# Patient Record
Sex: FEMALE | Race: WHITE | NOT HISPANIC OR LATINO | URBAN - METROPOLITAN AREA
[De-identification: names, ages, dates, MRNs, and addresses within clinical notes are randomized per-mention and may not be internally consistent; named-entity substitution may affect disease eponyms.]

---

## 2018-11-11 ENCOUNTER — EMERGENCY (EMERGENCY)
Facility: HOSPITAL | Age: 28
LOS: 0 days | Discharge: HOME | End: 2018-11-11
Attending: EMERGENCY MEDICINE | Admitting: EMERGENCY MEDICINE
Payer: SELF-PAY

## 2018-11-11 VITALS
SYSTOLIC BLOOD PRESSURE: 121 MMHG | RESPIRATION RATE: 18 BRPM | HEIGHT: 67 IN | TEMPERATURE: 98 F | HEART RATE: 93 BPM | OXYGEN SATURATION: 100 % | DIASTOLIC BLOOD PRESSURE: 80 MMHG | WEIGHT: 169.98 LBS

## 2018-11-11 DIAGNOSIS — S83.91XA SPRAIN OF UNSPECIFIED SITE OF RIGHT KNEE, INITIAL ENCOUNTER: ICD-10-CM

## 2018-11-11 DIAGNOSIS — M25.561 PAIN IN RIGHT KNEE: ICD-10-CM
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
## 2018-11-11 PROCEDURE — 99284 EMERGENCY DEPT VISIT MOD MDM: CPT

## 2018-11-11 PROCEDURE — 73562 X-RAY EXAM OF KNEE 3: CPT | Mod: 26,RT

## 2018-11-11 RX ORDER — OXYCODONE AND ACETAMINOPHEN 5; 325 MG/1; MG/1
1 TABLET ORAL ONCE
Qty: 0 | Refills: 0 | Status: DISCONTINUED | OUTPATIENT
Start: 2018-11-11 | End: 2018-11-11

## 2018-11-11 RX ORDER — IBUPROFEN 200 MG
1 TABLET ORAL
Qty: 20 | Refills: 0 | OUTPATIENT
Start: 2018-11-11 | End: 2018-11-15

## 2018-11-11 RX ORDER — IBUPROFEN 200 MG
600 TABLET ORAL ONCE
Qty: 0 | Refills: 0 | Status: COMPLETED | OUTPATIENT
Start: 2018-11-11 | End: 2018-11-11

## 2018-11-11 RX ADMIN — Medication 600 MILLIGRAM(S): at 17:56

## 2018-11-11 RX ADMIN — OXYCODONE AND ACETAMINOPHEN 1 TABLET(S): 5; 325 TABLET ORAL at 17:56

## 2018-11-11 NOTE — ED ADULT TRIAGE NOTE - CHIEF COMPLAINT QUOTE
pt BIBEMS c/o R knee pain s/p collision while playing soccer. pt reports hearing a "pop" and has not been able to straighten knee since. leg immobilized PTA by EMS.

## 2018-11-11 NOTE — ED PROVIDER NOTE - PROGRESS NOTE DETAILS
MECCA Salazar:   Patient has been seen, evaluated and orders have been written by the attending in intake. Patient is stable.  I will follow up the results of orders written and I will continue to evaluate/observe the patient.  Right knee pain after running into another .  Pain with bending knee.  Tendon function intact.  Neg. dislocation.  Will xray.  Juany Salazar PA-C nabor neg.  Will provided knee immobilizer and crutches.  Juany Salazar PA-C

## 2018-11-11 NOTE — ED PROVIDER NOTE - MEDICAL DECISION MAKING DETAILS
XR unremarkable for fracture.  RLE well perfused, good pulses, warm.  Likely patellar dislocation with spontaneous reduction prior to arrival.  Pt given pain meds in ED.  D/c home with knee immobilizer, crutches, f/u with orthopedics.

## 2018-11-11 NOTE — ED PROVIDER NOTE - MUSCULOSKELETAL, MLM
+TTP medial right knee and over tibial tuberosity. No deformity. Patella located. 2+DP and PT pulses B/L. +Small knee effusion of the right.

## 2018-11-11 NOTE — ED PROVIDER NOTE - OBJECTIVE STATEMENT
29 y/o female BIBA with no PMHx presents to the ED c/o right knee pain s/p injury 45 minutes PTA. Pt was playing soccer when she ran into another girl and fell. Pt states that she felt her patella shift out of position and back in. Pain is aggregated by movement. Denies any other complaints at this time. NKDA.

## 2018-11-11 NOTE — ED PROCEDURE NOTE - CPROC ED POST PROC CARE GUIDE1
Verbal/written post procedure instructions were given to patient/caregiver./Follow up with orthopedist/Keep the cast/splint/dressing clean and dry./Elevate the injured extremity as instructed.

## 2020-08-21 NOTE — ED ADULT TRIAGE NOTE - MODE OF ARRIVAL
EMS Detail Level: Detailed Size Of Lesion: 1.1 X Size Of Lesion In Cm (Optional): 1 Incorporate Mauc In Note: Yes

## 2022-10-28 ENCOUNTER — APPOINTMENT (OUTPATIENT)
Dept: ORTHOPEDIC SURGERY | Facility: CLINIC | Age: 32
End: 2022-10-28
Payer: COMMERCIAL

## 2022-10-28 VITALS — WEIGHT: 180 LBS | HEIGHT: 68 IN | BODY MASS INDEX: 27.28 KG/M2

## 2022-10-28 DIAGNOSIS — Z78.9 OTHER SPECIFIED HEALTH STATUS: ICD-10-CM

## 2022-10-28 PROBLEM — Z00.00 ENCOUNTER FOR PREVENTIVE HEALTH EXAMINATION: Status: ACTIVE | Noted: 2022-10-28

## 2022-10-28 PROCEDURE — 99204 OFFICE O/P NEW MOD 45 MIN: CPT

## 2022-10-28 PROCEDURE — 73564 X-RAY EXAM KNEE 4 OR MORE: CPT | Mod: RT

## 2022-10-28 NOTE — HISTORY OF PRESENT ILLNESS
[de-identified] : The patient is a 32 year old R hand dominant F who presents today complaining of right knee pain. Hurt knee while dancing. Hx of ACL, Hamstring and cadaver tissue was used in surgery for ACL. She cannot walk without crutches. Surgery was 4 years ago. \par Date of Injury/Onset: 10/8/2022\par Pain:    At Rest: 2-3/10 \par With Activity:  10/10 \par Mechanism of injury: Dancing at a wedding and twisted the right knee \par This is not a Work Related Injury being treated under Worker's Compensation.\par This is not  an athletic injury occurring associated with an interscholastic or organized sports team.\par Quality of symptoms: aching , throbbing , buckling \par Improves with: rest , Motrin  \par Worse with: kneeling, overuse \par Prior treatment: 2018- ACL/MCL/LCL repair - Dr. 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

## 2022-10-28 NOTE — DISCUSSION/SUMMARY
[de-identified] : Patient will have a right knee MRI to evaluate for post traumatic ACL re tear. \par Patient was asked to bring operative reports from prior ACL reconstruction. \par They will follow up after test.\par \par will also likely require a preop CT if necessary pending MRI\par \par "Written by Aries Ron, acting as Scribe for Lance Shay M.D" \par \par Home Exercise \par \par  The patient is instructed on a home exercise program. \par  \par RICE \par I explained to the patient that rest, ice, compression, and elevation would benefit them.  They may return to activity after follow-up or when they no longer have any pain. \par  \par Pain Guide Activities \par The patient was advised to let pain guide the gradual advancement of activities. \par  \par Activity Modification \par The patient was advised to modify their activities. \par  \par Dx / Natural History \par The patient was advised of the diagnosis.  The natural history of the pathology was explained in full to the patient in layman's terms.  Several different treatment options were discussed and explained in full to the patient including the risks and benefits of both surgical and non-surgical treatments.  All questions and concerns were answered.\par

## 2022-10-28 NOTE — PHYSICAL EXAM
[Positive] : positive anterior draw [Right] : right knee [All Views] : anteroposterior, lateral, skyline, and anteroposterior standing [de-identified] : Neurologic: normal coordination, normal DTR UE/LE , normal sensation, normal mood and affect, orientated and able to communicate.\par \par Skin: normal skin, no rash, no ulcers and no lesions.\par \par Lymphatic: no obvious lymphadenopathy in areas examined.\par \par Constitutional: well developed and well nourished.\par \par Cardiovascular: peripheral vascular exam is grossly normal.\par \par Pulmonary: no respiratory distress, lungs clear to auscultation bilaterally.\par \par Abdomen: normal bowel sounds, non-tender, no HSM and no mass.\par  [] : patient ambulates with assistive device [FreeTextEntry8] : Proximal MCL tenderness  [FreeTextEntry9] : 30-91 [de-identified] : 1+ valgus

## 2022-11-01 ENCOUNTER — APPOINTMENT (OUTPATIENT)
Dept: ORTHOPEDIC SURGERY | Facility: CLINIC | Age: 32
End: 2022-11-01

## 2022-11-04 ENCOUNTER — RESULT REVIEW (OUTPATIENT)
Age: 32
End: 2022-11-04

## 2022-11-11 ENCOUNTER — APPOINTMENT (OUTPATIENT)
Dept: ORTHOPEDIC SURGERY | Facility: CLINIC | Age: 32
End: 2022-11-11

## 2022-11-11 VITALS — HEIGHT: 68 IN | BODY MASS INDEX: 27.28 KG/M2 | WEIGHT: 180 LBS

## 2022-11-11 PROCEDURE — 99214 OFFICE O/P EST MOD 30 MIN: CPT

## 2022-11-11 NOTE — PHYSICAL EXAM
[Positive] : positive anterior draw [Right] : right knee [All Views] : anteroposterior, lateral, skyline, and anteroposterior standing [de-identified] : Neurologic: normal coordination, normal DTR UE/LE , normal sensation, normal mood and affect, orientated and able to communicate.\par \par Skin: normal skin, no rash, no ulcers and no lesions.\par \par Lymphatic: no obvious lymphadenopathy in areas examined.\par \par Constitutional: well developed and well nourished.\par \par Cardiovascular: peripheral vascular exam is grossly normal.\par \par Pulmonary: no respiratory distress, lungs clear to auscultation bilaterally.\par \par Abdomen: normal bowel sounds, non-tender, no HSM and no mass.\par  [] : patient ambulates with assistive device [FreeTextEntry8] : Proximal MCL tenderness  [FreeTextEntry9] : 30-06 [de-identified] : 1+ valgus

## 2022-11-11 NOTE — HISTORY OF PRESENT ILLNESS
[de-identified] : The patient is a 32 year old R hand dominant F who presents today complaining of right knee pain. \par Date of Injury/Onset: 10/8/2022\par Pain: At Rest: 2-3/10 \par With Activity: 10/10 \par Mechanism of injury: Dancing at a wedding and twisted the right knee \par This is not a Work Related Injury being treated under Worker's Compensation.\par This is not an athletic injury occurring associated with an interscholastic or organized sports team.\par Quality of symptoms: aching , throbbing , buckling \par Improves with: rest , Motrin \par Worse with: kneeling, overuse \par Prior treatment: 2018- ACL/MCL/LCL repair - Dr. 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

## 2022-11-11 NOTE — DISCUSSION/SUMMARY
[de-identified] : Patient will have right knee MR arthrogram to evaluate for cartilage lesion, CT arthrogram to evaluate bone tunnels.\par \par POSSIBLE PIVOTING AND RECURRENT KNEE INSTABILITY AND NEED FOR ACL REVISOIN\par LARGE CHONDRAL LESION PRESENT\par \par WE WILL PERFORM THE BELOW PROCEDURE, DEPENDING ON FINDINGS WITH POSSIBLE ACL REVISION OR CARTILAGE RESOTRATION PROCEDURE IN FUTURE\par \par Conservative treatment, nontreatment, nonsurgical intervention and surgical intervention treatment options have been reviewed with the patient. The patient continues to be symptomatic [and has failed conservative treatment], and elects to move forward with surgical intervention. The patient is indicated for RIGHT KNEE EXAMINATION/MANIPULATION UNDER ANESTHESIA, ARTHROSCOPIC PARTIAL MEDIAL MENISCECTOMY, EXCISION OF CYCLOPS LESION, CHONDROPLASTY MEDIAL FEMORAL CONDYLE and all indicated procedures. As such the alternatives, benefits and risks, of the above procedure, including but not limited to bleeding, infection, neurovascular injury, loss of limb, loss of life, DVT, PE, RSD, inability to return to previous level of activity, inability to return to previous level of employment, advancement of or to osteoarthritic changes, joint instability or motion loss, hardware failure or migration, failure to resolve all symptoms, failure to return to sports and need for further procedures, as well as specific risk of [none] were discussed with the patient and/or their legal guardian who agreed to move forward with surgical intervention. They have reviewed and signed the consent form today after expressing understanding of the above documented conversation. The patient or their representative will contact my office as instructed on the preoperative instruction sheet they received today to schedule surgery in a timely manner as discussed.\par \par As a post-operative protocol, I am prescribing an iceless cold/heat compression therapy device for at home use to be used 3-5 times per day at 40 degrees for 35 days as an alternative to pain medication. I would like my patient to begin with simultaneous cold & compression therapy at 10mm pressure. At the patients follow up I will determine whether they should continue with cold, or if they should transition to contrast cold/heat compression therapy. Unlike a conventional cold therapy unit that requires ice, the ThermX iceless device is set to a prescribed temperature that it will remain throughout the entire duration of use, whether that be cold compression, heat compression, or contrast compression. Cold therapy units that depend on ice melt over a very short period and do not provide compression which limits the compliance and effectiveness for pain/inflammation reduction that I am targeting for my patient. I have reached out to [employer] [facility] [place] to supply this device as they are the exclusive provider of the ThermX and the patient will be contacted and instructed on how to utilize the device.\par \par "Written by Aries Ron, acting as Scribe for Lance Shay M.D" \par \par Home Exercise \par \par  The patient is instructed on a home exercise program. \par  \par RICE \par I explained to the patient that rest, ice, compression, and elevation would benefit them.  They may return to activity after follow-up or when they no longer have any pain. \par  \par Pain Guide Activities \par The patient was advised to let pain guide the gradual advancement of activities. \par  \par Activity Modification \par The patient was advised to modify their activities. \par  \par Dx / Natural History \par The patient was advised of the diagnosis.  The natural history of the pathology was explained in full to the patient in layman's terms.  Several different treatment options were discussed and explained in full to the patient including the risks and benefits of both surgical and non-surgical treatments.  All questions and concerns were answered.\par

## 2022-11-11 NOTE — DATA REVIEWED
[FreeTextEntry1] : 11/4/22 RT knee MRI: Acute bucket-handle tear of the medial meniscus.\par \par  Trace joint effusion.\par \par Mild degeneration of an ACL graft, without graft tear or detachment.\par \par Marked postoperative arthrofibrosis along the anterior margin of the graft.\par \par Moderate medial patellar chondromalacia, new compared to the 2018 MR study.\par Additional chondromalacia over the medial femoral condyle, worse compared to the\par previous study.

## 2022-12-05 ENCOUNTER — APPOINTMENT (OUTPATIENT)
Dept: ORTHOPEDIC SURGERY | Facility: CLINIC | Age: 32
End: 2022-12-05

## 2022-12-19 ENCOUNTER — APPOINTMENT (OUTPATIENT)
Dept: PHYSICAL MEDICINE AND REHAB | Facility: CLINIC | Age: 32
End: 2022-12-19

## 2022-12-19 VITALS
BODY MASS INDEX: 27.28 KG/M2 | RESPIRATION RATE: 16 BRPM | TEMPERATURE: 98 F | HEART RATE: 80 BPM | WEIGHT: 180 LBS | HEIGHT: 68 IN | DIASTOLIC BLOOD PRESSURE: 68 MMHG | OXYGEN SATURATION: 100 % | SYSTOLIC BLOOD PRESSURE: 114 MMHG

## 2022-12-19 DIAGNOSIS — Z01.818 ENCOUNTER FOR OTHER PREPROCEDURAL EXAMINATION: ICD-10-CM

## 2022-12-19 PROCEDURE — 36410 VNPNXR 3YR/> PHY/QHP DX/THER: CPT

## 2022-12-19 PROCEDURE — 99204 OFFICE O/P NEW MOD 45 MIN: CPT | Mod: 25

## 2022-12-19 PROCEDURE — 93000 ELECTROCARDIOGRAM COMPLETE: CPT

## 2022-12-20 LAB
ANION GAP SERPL CALC-SCNC: 8 MMOL/L
BASOPHILS # BLD AUTO: 0.03 K/UL
BASOPHILS NFR BLD AUTO: 0.7 %
BUN SERPL-MCNC: 12 MG/DL
CALCIUM SERPL-MCNC: 9 MG/DL
CHLORIDE SERPL-SCNC: 106 MMOL/L
CO2 SERPL-SCNC: 26 MMOL/L
CREAT SERPL-MCNC: 0.9 MG/DL
EGFR: 87 ML/MIN/1.73M2
EOSINOPHIL # BLD AUTO: 0.03 K/UL
EOSINOPHIL NFR BLD AUTO: 0.7 %
GLUCOSE SERPL-MCNC: 93 MG/DL
HCT VFR BLD CALC: 37.4 %
HGB BLD-MCNC: 12.2 G/DL
IMM GRANULOCYTES NFR BLD AUTO: 0 %
LYMPHOCYTES # BLD AUTO: 2.14 K/UL
LYMPHOCYTES NFR BLD AUTO: 46.5 %
MAN DIFF?: NORMAL
MCHC RBC-ENTMCNC: 31.4 PG
MCHC RBC-ENTMCNC: 32.6 GM/DL
MCV RBC AUTO: 96.1 FL
MONOCYTES # BLD AUTO: 0.43 K/UL
MONOCYTES NFR BLD AUTO: 9.3 %
NEUTROPHILS # BLD AUTO: 1.97 K/UL
NEUTROPHILS NFR BLD AUTO: 42.8 %
PLATELET # BLD AUTO: 292 K/UL
POTASSIUM SERPL-SCNC: 4.4 MMOL/L
RBC # BLD: 3.89 M/UL
RBC # FLD: 13 %
SODIUM SERPL-SCNC: 140 MMOL/L
WBC # FLD AUTO: 4.6 K/UL

## 2022-12-20 NOTE — PLAN
[FreeTextEntry1] : Labs Drawn by Dr. Ariel Osorio due to poor venous access.  Patient required lab testing due to conditions in their past medical history requiring periodic monitoring.  Labs were sent to [facility].\par \par EKG - NSR -59 , HOLLIS - 0.230, No acute T wave changes noted..\par \par Increase fluid intake.\par \par Labs reviewed.\par \par Patient is medically optimized at this time and may proceed with proposed procedure.\par

## 2022-12-20 NOTE — HISTORY OF PRESENT ILLNESS
[FreeTextEntry1] : Medical clearance [de-identified] : Patient encounter today for medical clearance requested by  for right knee arthroscopy scheduled on 12/29/22 at the surgery center.   Patient sustained a right bucket-handle tear of medial meniscus in October. States he has been doing well.  Denies any CP, SOB or diff breathing.  No recent fever, chills, cough or cold type symptoms. No recent LOC or head trauma. He denies a history of seizure disorder. Patient had a physical within the last year.  Has no other complaints at this time.\par

## 2022-12-29 ENCOUNTER — APPOINTMENT (OUTPATIENT)
Dept: ORTHOPEDIC SURGERY | Facility: AMBULATORY SURGERY CENTER | Age: 32
End: 2022-12-29
Payer: SELF-PAY

## 2022-12-29 PROCEDURE — 27570 FIXATION OF KNEE JOINT: CPT | Mod: 59,RT

## 2022-12-29 PROCEDURE — 29875 ARTHRS KNEE SURG SYNVCT LMTD: CPT | Mod: 59,RT

## 2022-12-29 PROCEDURE — 27570 FIXATION OF KNEE JOINT: CPT | Mod: AS,59,RT

## 2022-12-29 PROCEDURE — 29875 ARTHRS KNEE SURG SYNVCT LMTD: CPT | Mod: AS,59,RT

## 2022-12-29 PROCEDURE — 29881 ARTHRS KNE SRG MNISECTMY M/L: CPT | Mod: AS,RT

## 2022-12-29 PROCEDURE — 29881 ARTHRS KNE SRG MNISECTMY M/L: CPT | Mod: RT

## 2022-12-29 RX ORDER — DOCUSATE SODIUM 50 MG/1
50 CAPSULE, LIQUID FILLED ORAL
Qty: 21 | Refills: 0 | Status: ACTIVE | COMMUNITY
Start: 2022-12-29 | End: 1900-01-01

## 2022-12-29 RX ORDER — ONDANSETRON 4 MG/1
4 TABLET, ORALLY DISINTEGRATING ORAL EVERY 8 HOURS
Qty: 12 | Refills: 0 | Status: ACTIVE | COMMUNITY
Start: 2022-12-29 | End: 1900-01-01

## 2022-12-29 RX ORDER — HYDROCODONE BITARTRATE AND ACETAMINOPHEN 5; 325 MG/1; MG/1
5-325 TABLET ORAL
Qty: 30 | Refills: 0 | Status: ACTIVE | COMMUNITY
Start: 2022-12-29 | End: 1900-01-01

## 2023-01-10 ENCOUNTER — APPOINTMENT (OUTPATIENT)
Dept: ORTHOPEDIC SURGERY | Facility: CLINIC | Age: 33
End: 2023-01-10
Payer: COMMERCIAL

## 2023-01-10 VITALS — BODY MASS INDEX: 27.28 KG/M2 | HEIGHT: 68 IN | WEIGHT: 180 LBS

## 2023-01-10 PROCEDURE — 99024 POSTOP FOLLOW-UP VISIT: CPT

## 2023-01-11 NOTE — DISCUSSION/SUMMARY
[de-identified] : Inspected wound\par Removed sutures\par Applied steri-strips\par Reviewed all surgical images with patient and provided copies to take home\par Continue physical therapy\par Follow up in 4 weeks \par \par No work 6 months from time of revision \par \par Intraoperatively it was determined the patient has a large pivot shift and an intact but incompetent ACL, as well as cartilage defects to her medial femoral condyle.  The decision to move forward with revision ACL reconstruction was made.\par \par ---\par Surgical Consent:\par \par -Conservative treatment, nontreatment, nonsurgical intervention and surgical intervention treatment options have been reviewed with the patient.  The patient continues to be symptomatic [and has failed conservative treatment], and elects to move forward with surgical intervention.  The patient is indicated for RIGHT KNEE ARTHROSCOPIC REVISION ACL RECONSTRUCTION WITH QUADRICEPS TENDON AUTOGRAFT, BONE TUNNEL ALLOGRAFT BONE GRAFTING, ABBRASION ARTHROPLASTY MEDIAL FEMORAL CONDYLE, REMOVAL OF DEEP HARDWARE and all indicated procedures. As such the alternatives, benefits and risks, of the above procedure, including but not limited to bleeding, infection, neurovascular injury, loss of limb, loss of life,  DVT, PE, RSD, inability to return to previous level of activity, inability to return to previous level of employment, advancement of or to osteoarthritic changes, joint instability or motion loss, hardware failure or migration, failure to resolve all symptoms, failure to return to sports and need for further procedures, as well as specific risk of RE-TEAR, ARTHROFIBROSIS, CONTINUED PAIN FROM OSTEOARTHRITIS were discussed with the patient and/or their legal guardian who agreed to move forward with surgical intervention.  They have reviewed and signed the consent form today after expressing understanding of the above documented conversation. The patient or their representative will contact my office as instructed on the preoperative instruction sheet they received today to schedule surgery in a timely manner as discussed.\par \par \par \par CPT authorization - \par [id number], [id number], [id number], [id number]\par \par \par -As a post-operative protocol, I am prescribing an iceless cold/heat compression therapy device for at home use to be used 3-5 times per day at 40 degrees for 35 days as an alternative to pain medication. I would like my patient to begin with simultaneous cold & compression therapy at 10mm pressure. At the patients follow up I will determine whether they should continue with cold, or if they should transition to contrast cold/heat compression therapy. Unlike a conventional cold therapy unit that requires ice, the ThermX iceless device is set to a prescribed temperature that it will remain throughout the entire duration of use, whether that be cold compression, heat compression, or contrast compression. Cold therapy units that depend on ice melt over a very short period and do not provide compression which limits the compliance and effectiveness for pain/inflammation reduction that I am targeting for my patient. I have reached out to [facility] [employer] [facility] to supply this device as they are the exclusive provider of the ThermX and the patient will be contacted and instructed on how to utilize the device.\par ------------ \par -----------------------------------------------\par Home Exercise\par The patient is instructed on a home exercise program.\par \par TIM MATUTE Acting as a Scribe for Dr. Shay\par I, Tim Matute, attest that this documentation has been prepared under the direction and in the presence of Provider Lance Shay MD.\par \par Activity Modification\par The patient was advised to modify their activities.\par \par Dx / Natural History\par The patient was advised of the diagnosis.  The natural history of the pathology was explained in full to the patient in layman's terms.  Several different treatment options were discussed and explained in full to the patient including the risks and benefits of both surgical and non-surgical treatments.  All questions and concerns were answered.\par \par Pain Guide Activities\par The patient was advised to let pain guide the gradual advancement of activities.\par \par RICE\par I explained to the patient that rest, ice, compression, and elevation would benefit them.  They may return to activity after follow-up or when they no longer have any pain.

## 2023-01-11 NOTE — HISTORY OF PRESENT ILLNESS
[de-identified] : The patient is s/p RIGHT KNEE MANIPULATION AND EXAMINATION UNDER ANESTHESIA, ARTH PARTIAL MEDIAL MENISCECTOMY AND LIMITED SYNOVECTOMY WITH ARTHROFIBROSIS AND CYCLOPS LESION EXCISION\par Date of Surgery: 12/29/22\par Pain:    At Rest: 0/10 \par With Activity:  7/10 \par Mechanism of injury: Dancing at a wedding and twisted the right knee \par This is NOT a Work Related Injury being treated under Worker's Compensation.\par This is NOT an athletic injury occurring associated with an interscholastic or organized sports team.\par Treatment/Imaging/Studies Since Last Visit: Sx, PT\par 	Reports Available For Review Today: Sx Photos \par Out of work/sport: _, since _\par School/Sport/Position/Occupation: Dancing at a wedding and twisted the right knee \par Change since last visit: Sx \par Additional Information: None\par

## 2023-01-11 NOTE — PHYSICAL EXAM
[de-identified] : Neurologic: normal sensation, normal mood and affect, orientated and able to communicate\par Skin: no rash, no lesions\par Constitutional: well developed and well nourished\par Cardiovascular: extremities warm and well perfused\par Pulmonary: no respiratory distress\par  \par Right Knee: No effusion, clean and dry incisions, intact skin, no fluctuance, no sign of infection, no wound erythema, no induration, no drainage, sutures removed, steri-strips applied. \par ROM: 30-90\par X-Ray 4+ Views: Unremarkable \par

## 2023-02-07 ENCOUNTER — APPOINTMENT (OUTPATIENT)
Dept: ORTHOPEDIC SURGERY | Facility: CLINIC | Age: 33
End: 2023-02-07
Payer: COMMERCIAL

## 2023-02-07 VITALS — WEIGHT: 180 LBS | HEIGHT: 68 IN | BODY MASS INDEX: 27.28 KG/M2

## 2023-02-07 PROCEDURE — 99214 OFFICE O/P EST MOD 30 MIN: CPT

## 2023-02-07 NOTE — DISCUSSION/SUMMARY
[de-identified] : CT scan right knee to eval bone tunnel width for surgical planning\par \par Continue physical therapy \par Email provided \par \par No work 6 months from time of revision \par \par Intraoperatively it was determined the patient has a large pivot shift and an intact but incompetent ACL, as well as cartilage defects to her medial femoral condyle.  The decision to move forward with revision ACL reconstruction was made.  We w\par \par ---\par Surgical Consent:\par \par -Conservative treatment, nontreatment, nonsurgical intervention and surgical intervention treatment options have been reviewed with the patient.  The patient continues to be symptomatic [and has failed conservative treatment], and elects to move forward with surgical intervention.  The patient is indicated for RIGHT KNEE ARTHROSCOPIC REVISION ACL RECONSTRUCTION WITH QUADRICEPS TENDON AUTOGRAFT, TOMEKA PROCEDURE, BONE TUNNEL ALLOGRAFT BONE GRAFTING, ABRASION ARTHROPLASTY MEDIAL FEMORAL CONDYLE, REMOVAL OF DEEP HARDWARE and all indicated procedures. As such the alternatives, benefits and risks, of the above procedure, including but not limited to bleeding, infection, neurovascular injury, loss of limb, loss of life,  DVT, PE, RSD, inability to return to previous level of activity, inability to return to previous level of employment, advancement of or to osteoarthritic changes, joint instability or motion loss, hardware failure or migration, failure to resolve all symptoms, failure to return to sports and need for further procedures, as well as specific risk of RE-TEAR, ARTHROFIBROSIS, CONTINUED PAIN FROM OSTEOARTHRITIS were discussed with the patient and/or their legal guardian who agreed to move forward with surgical intervention.  They have reviewed and signed the consent form today after expressing understanding of the above documented conversation. The patient or their representative will contact my office as instructed on the preoperative instruction sheet they received today to schedule surgery in a timely manner as discussed.\par \par \par \par CPT authorization - \par [id number], [id number], [id number], [id number]\par \par \par -As a post-operative protocol, I am prescribing an iceless cold/heat compression therapy device for at home use to be used 3-5 times per day at 40 degrees for 35 days as an alternative to pain medication. I would like my patient to begin with simultaneous cold & compression therapy at 10mm pressure. At the patients follow up I will determine whether they should continue with cold, or if they should transition to contrast cold/heat compression therapy. Unlike a conventional cold therapy unit that requires ice, the ThermX iceless device is set to a prescribed temperature that it will remain throughout the entire duration of use, whether that be cold compression, heat compression, or contrast compression. Cold therapy units that depend on ice melt over a very short period and do not provide compression which limits the compliance and effectiveness for pain/inflammation reduction that I am targeting for my patient. I have reached out to [employer] [facility] to supply this device as they are the exclusive provider of the ThermX and the patient will be contacted and instructed on how to utilize the device.\par ------------ \par -----------------------------------------------\par Home Exercise\par The patient is instructed on a home exercise program.\par \par TIM MATUTE Acting as a Scribe for Dr. Shay\par I, Tim Matute, attest that this documentation has been prepared under the direction and in the presence of Provider Lance Shay MD.\par \par Activity Modification\par The patient was advised to modify their activities.\par \par Dx / Natural History\par The patient was advised of the diagnosis.  The natural history of the pathology was explained in full to the patient in layman's terms.  Several different treatment options were discussed and explained in full to the patient including the risks and benefits of both surgical and non-surgical treatments.  All questions and concerns were answered.\par \par Pain Guide Activities\par The patient was advised to let pain guide the gradual advancement of activities.\par \par RICE\par I explained to the patient that rest, ice, compression, and elevation would benefit them.  They may return to activity after follow-up or when they no longer have any pain.

## 2023-02-07 NOTE — PHYSICAL EXAM
[de-identified] : Neurologic: normal sensation, normal mood and affect, orientated and able to communicate\par Skin: no rash, no lesions\par Constitutional: well developed and well nourished\par Cardiovascular: extremities warm and well perfused\par Pulmonary: no respiratory distress\par  \par Right Knee: No effusion, clean and dry incisions, intact skin, no fluctuance, no sign of infection, no wound erythema, no induration, no drainage, sutures removed, steri-strips applied. \par ROM: 30-90\par X-Ray 4+ Views: Unremarkable \par

## 2023-02-07 NOTE — HISTORY OF PRESENT ILLNESS
[de-identified] : The patient is s/p RIGHT KNEE MANIPULATION AND EXAMINATION UNDER ANESTHESIA, ARTH PARTIAL MEDIAL MENISCECTOMY AND LIMITED SYNOVECTOMY WITH ARTHROFIBROSIS AND CYCLOPS LESION EXCISION Date of Surgery: 12/29/22 Pain: At Rest: 2/10  With Activity: 5/10  Mechanism of injury: Dancing at a wedding and twisted the right knee  This is NOT a Work Related Injury being treated under Worker's Compensation. This is NOT an athletic injury occurring associated with an interscholastic or organized sports team. Treatment/Imaging/Studies Since Last Visit: Sx, PT 	Reports Available For Review Today: Sx Photos  Out of work/sport: _, since _ School/Sport/Position/Occupation: Dancing at a wedding and twisted the right knee  Change since last visit: Sx  Additional Information: None

## 2023-02-17 ENCOUNTER — APPOINTMENT (OUTPATIENT)
Dept: ORTHOPEDIC SURGERY | Facility: HOSPITAL | Age: 33
End: 2023-02-17
Payer: COMMERCIAL

## 2023-02-17 PROCEDURE — 29888 ARTHRS AID ACL RPR/AGMNTJ: CPT | Mod: 78,RT

## 2023-02-17 PROCEDURE — 29888 ARTHRS AID ACL RPR/AGMNTJ: CPT | Mod: AS,78,RT

## 2023-02-17 PROCEDURE — 20680 REMOVAL OF IMPLANT DEEP: CPT | Mod: 59,78,RT

## 2023-02-17 PROCEDURE — 20902 REMOVAL OF BONE FOR GRAFT: CPT | Mod: 59,78,RT

## 2023-02-17 PROCEDURE — 20680 REMOVAL OF IMPLANT DEEP: CPT | Mod: AS,59,78,RT

## 2023-02-17 PROCEDURE — 20902 REMOVAL OF BONE FOR GRAFT: CPT | Mod: AS,78,59,RT

## 2023-02-17 RX ORDER — DOCUSATE SODIUM 50 MG/1
50 CAPSULE, LIQUID FILLED ORAL
Qty: 21 | Refills: 0 | Status: ACTIVE | COMMUNITY
Start: 2023-02-17 | End: 1900-01-01

## 2023-02-17 RX ORDER — ONDANSETRON 4 MG/1
4 TABLET, ORALLY DISINTEGRATING ORAL EVERY 8 HOURS
Qty: 12 | Refills: 0 | Status: ACTIVE | COMMUNITY
Start: 2023-02-17 | End: 1900-01-01

## 2023-02-17 RX ORDER — HYDROCODONE BITARTRATE AND ACETAMINOPHEN 5; 325 MG/1; MG/1
5-325 TABLET ORAL
Qty: 30 | Refills: 0 | Status: ACTIVE | COMMUNITY
Start: 2023-02-17 | End: 1900-01-01

## 2023-02-28 ENCOUNTER — APPOINTMENT (OUTPATIENT)
Dept: ORTHOPEDIC SURGERY | Facility: CLINIC | Age: 33
End: 2023-02-28
Payer: COMMERCIAL

## 2023-02-28 VITALS — WEIGHT: 180 LBS | HEIGHT: 68 IN | BODY MASS INDEX: 27.28 KG/M2

## 2023-02-28 DIAGNOSIS — S83.511D SPRAIN OF ANTERIOR CRUCIATE LIGAMENT OF RIGHT KNEE, SUBSEQUENT ENCOUNTER: ICD-10-CM

## 2023-02-28 PROCEDURE — 99024 POSTOP FOLLOW-UP VISIT: CPT

## 2023-02-28 NOTE — DISCUSSION/SUMMARY
[de-identified] : Inspected wound\par Removed sutures\par Applied steri-strips\par Reviewed all surgical images with patient and provided copies to take home\par Continue physical therapy\par Follow up in 6 weeks \par \par Aspirated 20cc blood from right knee under ultrasound guidance.\par -----------------------------------------------\par Home Exercise\par The patient is instructed on a home exercise program.\par \par TIM MATUTE Acting as a Scribe for Dr. Shay\par I, Tim Matute, attest that this documentation has been prepared under the direction and in the presence of Provider Lance Shay MD.\par \par Activity Modification\par The patient was advised to modify their activities.\par \par Dx / Natural History\par The patient was advised of the diagnosis.  The natural history of the pathology was explained in full to the patient in layman's terms.  Several different treatment options were discussed and explained in full to the patient including the risks and benefits of both surgical and non-surgical treatments.  All questions and concerns were answered.\par \par Pain Guide Activities\par The patient was advised to let pain guide the gradual advancement of activities.\par \par RICE\par I explained to the patient that rest, ice, compression, and elevation would benefit them.  They may return to activity after follow-up or when they no longer have any pain.

## 2023-02-28 NOTE — HISTORY OF PRESENT ILLNESS
[de-identified] : The patient is s/p Right knee arthroscopic revision anterior cruciate ligament reconstruction with quadriceps tendon autograft bone tunnel allograft bone grafting abrasion arthroplasty medial femoral condyle removal deep hardware and all indicated procedure  \par Date of Surgery: 2/17/23\par Pain:    At Rest: 2/10 \par With Activity:  8/10 \par Mechanism of injury:Dancing at a wedding and twisted the right knee \par This is not a Work Related Injury being treated under Worker's Compensation.\par This is not  an athletic injury occurring associated with an interscholastic or organized sports team.\par Treatment/Imaging/Studies Since Last Visit: SX\par 	Reports Available For Review Today: SX DOC \par Out of work/sport: _, since _\par 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

## 2023-02-28 NOTE — PHYSICAL EXAM
[de-identified] : Neurologic: normal sensation, normal mood and affect, orientated and able to communicate\par Skin: no rash, no lesions\par Constitutional: well developed and well nourished\par Cardiovascular: extremities warm and well perfused\par Pulmonary: no respiratory distress\par  \par Right Knee: No effusion, clean and dry incisions, intact skin, no fluctuance, no sign of infection, no wound erythema, no induration, no drainage, sutures removed, steri-strips applied. \par ROM: 0-90\par X-Ray 4+ Views: Unremarkable \par

## 2023-04-11 ENCOUNTER — APPOINTMENT (OUTPATIENT)
Dept: ORTHOPEDIC SURGERY | Facility: CLINIC | Age: 33
End: 2023-04-11
Payer: COMMERCIAL

## 2023-04-11 VITALS — HEIGHT: 68 IN | BODY MASS INDEX: 27.28 KG/M2 | WEIGHT: 180 LBS

## 2023-04-11 PROCEDURE — 99024 POSTOP FOLLOW-UP VISIT: CPT

## 2023-04-11 NOTE — DISCUSSION/SUMMARY
[de-identified] : Continue physical therapy\par continue using knee sleeve \par Patient will follow up in 2 months \par \par -----------------------------------------------\par Home Exercise\par The patient is instructed on a home exercise program.\par \par FARHEEN MUELLER Acting as a Scribe for Dr. Shay\par I, Farheen Mueller, attest that this documentation has been prepared under the direction and in the presence of Provider Lance Shay MD.\par \par Activity Modification\par The patient was advised to modify their activities.\par \par Dx / Natural History\par The patient was advised of the diagnosis.  The natural history of the pathology was explained in full to the patient in layman's terms.  Several different treatment options were discussed and explained in full to the patient including the risks and benefits of both surgical and non-surgical treatments.  All questions and concerns were answered.\par \par Pain Guide Activities\par The patient was advised to let pain guide the gradual advancement of activities.\par \par RICE\par I explained to the patient that rest, ice, compression, and elevation would benefit them.  They may return to activity after follow-up or when they no longer have any pain.\par

## 2023-04-11 NOTE — PHYSICAL EXAM
[de-identified] : Neurologic: normal sensation, normal mood and affect, orientated and able to communicate\par Skin: no rash, no lesions\par Constitutional: well developed and well nourished\par Cardiovascular: extremities warm and well perfused\par Pulmonary: no respiratory distress\par  \par Right Knee: No effusion, clean and dry incisions, intact skin, no fluctuance, no sign of infection, no wound erythema, no induration, no drainage, patient can straighten her leg.\par ROM: 0-120\par X-Ray 4+ Views: Unremarkable \par \par

## 2023-04-11 NOTE — HISTORY OF PRESENT ILLNESS
[de-identified] : The patient is s/p Right knee arthroscopic revision anterior cruciate ligament reconstruction with quadriceps tendon autograft bone tunnel allograft bone grafting abrasion arthroplasty medial femoral condyle removal deep hardware and all indicated procedure  \par Date of Surgery: 2/17/23\par Pain:    At Rest: 0/10 \par With Activity:  7/10 \par Mechanism of injury:Dancing at a wedding and twisted the right knee \par This is not a Work Related Injury being treated under Worker's Compensation.\par This is not  an athletic injury occurring associated with an interscholastic or organized sports team.\par Treatment/Imaging/Studies Since Last Visit: PT\par 	Reports Available For Review Today:  \par Out of work/sport: _, since _\par 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 \par Additional Information: None\par

## 2023-06-13 ENCOUNTER — APPOINTMENT (OUTPATIENT)
Dept: ORTHOPEDIC SURGERY | Facility: CLINIC | Age: 33
End: 2023-06-13

## 2023-06-13 DIAGNOSIS — M25.561 PAIN IN RIGHT KNEE: ICD-10-CM

## 2023-06-13 DIAGNOSIS — M79.18 MYALGIA, OTHER SITE: ICD-10-CM

## 2023-06-13 DIAGNOSIS — S83.241D OTHER TEAR OF MEDIAL MENISCUS, CURRENT INJURY, RIGHT KNEE, SUBSEQUENT ENCOUNTER: ICD-10-CM

## 2023-09-08 ENCOUNTER — APPOINTMENT (OUTPATIENT)
Dept: ORTHOPEDIC SURGERY | Facility: CLINIC | Age: 33
End: 2023-09-08

## 2024-10-08 ENCOUNTER — APPOINTMENT (OUTPATIENT)
Dept: OBGYN | Facility: CLINIC | Age: 34
End: 2024-10-08
Payer: COMMERCIAL

## 2024-10-08 VITALS
DIASTOLIC BLOOD PRESSURE: 74 MMHG | SYSTOLIC BLOOD PRESSURE: 110 MMHG | HEIGHT: 68 IN | WEIGHT: 204 LBS | BODY MASS INDEX: 30.92 KG/M2

## 2024-10-08 DIAGNOSIS — N93.8 OTHER SPECIFIED ABNORMAL UTERINE AND VAGINAL BLEEDING: ICD-10-CM

## 2024-10-08 DIAGNOSIS — N92.6 IRREGULAR MENSTRUATION, UNSPECIFIED: ICD-10-CM

## 2024-10-08 DIAGNOSIS — Z00.00 ENCOUNTER FOR GENERAL ADULT MEDICAL EXAMINATION W/OUT ABNORMAL FINDINGS: ICD-10-CM

## 2024-10-08 DIAGNOSIS — Z01.419 ENCOUNTER FOR GYNECOLOGICAL EXAMINATION (GENERAL) (ROUTINE) W/OUT ABNORMAL FINDINGS: ICD-10-CM

## 2024-10-08 PROCEDURE — 99385 PREV VISIT NEW AGE 18-39: CPT

## 2024-10-08 PROCEDURE — 99214 OFFICE O/P EST MOD 30 MIN: CPT | Mod: 25

## 2024-10-12 LAB — HPV HIGH+LOW RISK DNA PNL CVX: NOT DETECTED

## 2024-10-14 LAB — CYTOLOGY CVX/VAG DOC THIN PREP: NORMAL
